# Patient Record
Sex: MALE | Race: WHITE | NOT HISPANIC OR LATINO
[De-identification: names, ages, dates, MRNs, and addresses within clinical notes are randomized per-mention and may not be internally consistent; named-entity substitution may affect disease eponyms.]

---

## 2021-11-16 ENCOUNTER — APPOINTMENT (OUTPATIENT)
Dept: INTERNAL MEDICINE | Facility: CLINIC | Age: 76
End: 2021-11-16
Payer: MEDICARE

## 2021-11-16 ENCOUNTER — NON-APPOINTMENT (OUTPATIENT)
Age: 76
End: 2021-11-16

## 2021-11-16 ENCOUNTER — LABORATORY RESULT (OUTPATIENT)
Age: 76
End: 2021-11-16

## 2021-11-16 VITALS
HEART RATE: 74 BPM | TEMPERATURE: 97.8 F | BODY MASS INDEX: 24.73 KG/M2 | OXYGEN SATURATION: 96 % | WEIGHT: 167 LBS | HEIGHT: 69 IN

## 2021-11-16 VITALS — DIASTOLIC BLOOD PRESSURE: 64 MMHG | SYSTOLIC BLOOD PRESSURE: 122 MMHG

## 2021-11-16 DIAGNOSIS — Z78.9 OTHER SPECIFIED HEALTH STATUS: ICD-10-CM

## 2021-11-16 DIAGNOSIS — Z84.1 FAMILY HISTORY OF DISORDERS OF KIDNEY AND URETER: ICD-10-CM

## 2021-11-16 PROCEDURE — 36415 COLL VENOUS BLD VENIPUNCTURE: CPT

## 2021-11-16 PROCEDURE — G0439: CPT

## 2021-11-16 PROCEDURE — 93000 ELECTROCARDIOGRAM COMPLETE: CPT

## 2021-11-16 NOTE — HISTORY OF PRESENT ILLNESS
[Spouse] : spouse [FreeTextEntry1] : cpx--to establish [de-identified] : was seeing MD at [facility]\par meds reviewed \par some benign short term memory loss reported by spouse which pt denies--no decline in adl's\par walks 10 miles/day at gym/does wts\par retired from [employer] for 14 yrs\par triple covid vaccinated\par sees HAL[facility]--last 5/21

## 2021-11-16 NOTE — HEALTH RISK ASSESSMENT
[Patient reported colonoscopy was normal] : Patient reported colonoscopy was normal [With Significant Other] : lives with significant other [] :  [Fully functional (bathing, dressing, toileting, transferring, walking, feeding)] : Fully functional (bathing, dressing, toileting, transferring, walking, feeding) [Fully functional (using the telephone, shopping, preparing meals, housekeeping, doing laundry, using] : Fully functional and needs no help or supervision to perform IADLs (using the telephone, shopping, preparing meals, housekeeping, doing laundry, using transportation, managing medications and managing finances) [ColonoscopyDate] : 1/16 [ColonoscopyComments] : Bartolo

## 2021-11-16 NOTE — ASSESSMENT
[FreeTextEntry1] : labs\par ecg\par pneumovax (if not given) and shingrex discussed\par pt defers neuro eval

## 2021-11-16 NOTE — PHYSICAL EXAM
[No Acute Distress] : no acute distress [Well Nourished] : well nourished [Well Developed] : well developed [Well-Appearing] : well-appearing [Normal Sclera/Conjunctiva] : normal sclera/conjunctiva [PERRL] : pupils equal round and reactive to light [EOMI] : extraocular movements intact [Normal Outer Ear/Nose] : the outer ears and nose were normal in appearance [Normal Oropharynx] : the oropharynx was normal [No JVD] : no jugular venous distention [No Lymphadenopathy] : no lymphadenopathy [Supple] : supple [Thyroid Normal, No Nodules] : the thyroid was normal and there were no nodules present [No Respiratory Distress] : no respiratory distress  [No Accessory Muscle Use] : no accessory muscle use [Clear to Auscultation] : lungs were clear to auscultation bilaterally [Normal Rate] : normal rate  [Regular Rhythm] : with a regular rhythm [Normal S1, S2] : normal S1 and S2 [No Murmur] : no murmur heard [No Carotid Bruits] : no carotid bruits [No Abdominal Bruit] : a ~M bruit was not heard ~T in the abdomen [No Varicosities] : no varicosities [Pedal Pulses Present] : the pedal pulses are present [No Edema] : there was no peripheral edema [No Palpable Aorta] : no palpable aorta [No Extremity Clubbing/Cyanosis] : no extremity clubbing/cyanosis [Soft] : abdomen soft [Non Tender] : non-tender [Non-distended] : non-distended [No Masses] : no abdominal mass palpated [No HSM] : no HSM [Normal Bowel Sounds] : normal bowel sounds [Normal Posterior Cervical Nodes] : no posterior cervical lymphadenopathy [Normal Anterior Cervical Nodes] : no anterior cervical lymphadenopathy [No CVA Tenderness] : no CVA  tenderness [No Spinal Tenderness] : no spinal tenderness [No Joint Swelling] : no joint swelling [Grossly Normal Strength/Tone] : grossly normal strength/tone [No Rash] : no rash [Coordination Grossly Intact] : coordination grossly intact [No Focal Deficits] : no focal deficits [Normal Gait] : normal gait [Deep Tendon Reflexes (DTR)] : deep tendon reflexes were 2+ and symmetric [Normal Affect] : the affect was normal [Normal Insight/Judgement] : insight and judgment were intact [Declined Rectal Exam] : declined rectal exam [Alert and Oriented x3] : oriented to person, place, and time [FreeTextEntry1] : as per gu

## 2021-11-17 ENCOUNTER — NON-APPOINTMENT (OUTPATIENT)
Age: 76
End: 2021-11-17

## 2021-11-17 LAB
ALBUMIN SERPL ELPH-MCNC: 4.4 G/DL
ALP BLD-CCNC: 113 U/L
ALT SERPL-CCNC: 16 U/L
ANION GAP SERPL CALC-SCNC: 13 MMOL/L
APPEARANCE: CLEAR
AST SERPL-CCNC: 21 U/L
BACTERIA: NEGATIVE
BASOPHILS # BLD AUTO: 0.06 K/UL
BASOPHILS NFR BLD AUTO: 0.8 %
BILIRUB SERPL-MCNC: 0.8 MG/DL
BILIRUBIN URINE: NEGATIVE
BLOOD URINE: NEGATIVE
BUN SERPL-MCNC: 22 MG/DL
CALCIUM SERPL-MCNC: 9.7 MG/DL
CHLORIDE SERPL-SCNC: 102 MMOL/L
CHOLEST SERPL-MCNC: 146 MG/DL
CO2 SERPL-SCNC: 24 MMOL/L
COLOR: NORMAL
CREAT SERPL-MCNC: 1.28 MG/DL
EOSINOPHIL # BLD AUTO: 0.16 K/UL
EOSINOPHIL NFR BLD AUTO: 2.1 %
GLUCOSE QUALITATIVE U: NEGATIVE
GLUCOSE SERPL-MCNC: 89 MG/DL
HCT VFR BLD CALC: 47.7 %
HDLC SERPL-MCNC: 44 MG/DL
HGB BLD-MCNC: 15.4 G/DL
HYALINE CASTS: 0 /LPF
IMM GRANULOCYTES NFR BLD AUTO: 0.3 %
KETONES URINE: NEGATIVE
LDLC SERPL CALC-MCNC: 83 MG/DL
LEUKOCYTE ESTERASE URINE: NEGATIVE
LYMPHOCYTES # BLD AUTO: 1.31 K/UL
LYMPHOCYTES NFR BLD AUTO: 17.2 %
MAN DIFF?: NORMAL
MCHC RBC-ENTMCNC: 31.6 PG
MCHC RBC-ENTMCNC: 32.3 GM/DL
MCV RBC AUTO: 97.7 FL
MICROSCOPIC-UA: NORMAL
MONOCYTES # BLD AUTO: 1.19 K/UL
MONOCYTES NFR BLD AUTO: 15.6 %
NEUTROPHILS # BLD AUTO: 4.89 K/UL
NEUTROPHILS NFR BLD AUTO: 64 %
NITRITE URINE: NEGATIVE
NONHDLC SERPL-MCNC: 101 MG/DL
PH URINE: 6.5
PLATELET # BLD AUTO: 296 K/UL
POTASSIUM SERPL-SCNC: 5.1 MMOL/L
PROT SERPL-MCNC: 7.1 G/DL
PROTEIN URINE: NEGATIVE
PSA SERPL-MCNC: 3.32 NG/ML
RBC # BLD: 4.88 M/UL
RBC # FLD: 13.2 %
RED BLOOD CELLS URINE: 2 /HPF
SODIUM SERPL-SCNC: 140 MMOL/L
SPECIFIC GRAVITY URINE: 1.02
SQUAMOUS EPITHELIAL CELLS: 0 /HPF
TRIGL SERPL-MCNC: 90 MG/DL
TSH SERPL-ACNC: 4.97 UIU/ML
UROBILINOGEN URINE: NORMAL
VIT B12 SERPL-MCNC: 466 PG/ML
WBC # FLD AUTO: 7.63 K/UL
WHITE BLOOD CELLS URINE: 0 /HPF

## 2021-11-18 LAB
THYROGLOB AB SERPL-ACNC: 500 IU/ML
THYROPEROXIDASE AB SERPL IA-ACNC: 192 IU/ML

## 2021-11-19 ENCOUNTER — NON-APPOINTMENT (OUTPATIENT)
Age: 76
End: 2021-11-19

## 2022-02-22 ENCOUNTER — APPOINTMENT (OUTPATIENT)
Dept: NEUROLOGY | Facility: CLINIC | Age: 77
End: 2022-02-22
Payer: MEDICARE

## 2022-02-22 VITALS
DIASTOLIC BLOOD PRESSURE: 96 MMHG | SYSTOLIC BLOOD PRESSURE: 159 MMHG | HEIGHT: 69 IN | WEIGHT: 168 LBS | BODY MASS INDEX: 24.88 KG/M2 | RESPIRATION RATE: 16 BRPM | TEMPERATURE: 97.9 F | HEART RATE: 68 BPM | OXYGEN SATURATION: 97 %

## 2022-02-22 PROCEDURE — 99204 OFFICE O/P NEW MOD 45 MIN: CPT

## 2022-02-23 LAB
FOLATE SERPL-MCNC: >20 NG/ML
HCYS SERPL-MCNC: 13.7 UMOL/L
T4 FREE SERPL-MCNC: 1.1 NG/DL

## 2022-02-23 NOTE — HISTORY OF PRESENT ILLNESS
[FreeTextEntry1] : 75 yo man accompanied by his wife, who brought him in.  He feels he has some short-term memory problems.  For example, he often forgets people's names (ie people on TV). \par \par His wife reports that he has been having difficulty with cognitive processing.  For example, about 5 years, she was driving to a family members house, and she took a detour, and he was a passenger in the car, and became very agitated in the car because he thought she was getting lost.\par \par As another example, his wife mentions an event where he was watching his grandchildren in the basement and the children were getting out of control, and she found him not attending to them, which is unusual for him.\par \par His wife reports that he repeats questions often, but he says there is usually an explanation for it (ie he's trying to clarify something).\bethanie \par He is independent in his ADLs.  He exercises often.  He drives and does not get lost around town, but goes to familiar places only.  He takes 5 mile walks around his neighborhood, does not get lost.  He exercises with friends, has lunch with friends, plays golf, and none of his friends have voiced similar concerns as his wife.\par \par His wife does the bill paying.  \par \par He was an  (retired in 2008), and files their taxes on [employer] tax each year.  He also files family members taxes without any problem.    \par \par He watches TV and follows the storylines.  He follows current events without any problem.\bethanie

## 2022-02-23 NOTE — PHYSICAL EXAM
[FreeTextEntry1] : MMSE = 30/30\par \par Awake, alert, oriented x 3.  Language fluent.  Comprehension intact.  Naming intact.  Repetition intact.  Affect normal.  Cranial nerves grossly intact.  Motor exam: normal bulk, normal tone, 5/5 in all four extremities.  No tremors or fasciculations.  Sensory exam: intact to LT/PP/CARILN/vibration.  DTRs: 2+ throughout.  Coordination: no dysmetria.  Gait: normal toe/heel/tandem gait.  Romberg - negative\par

## 2022-02-23 NOTE — ASSESSMENT
[FreeTextEntry1] : 77 yo man with cognitive symptoms reported by wife.  Independent in ADLs.  MMSE today is 30/30. Neurological exam is non-focal.\par \par Plan:\par 1. MRI brain\par 2. routine EEG\par 3. Labs: MMA, folate, homocysteine\par 4. Patient agrees with plan.\par 5. Follow up after testing completed.\par \par Marco Boss MD\par [facility]\par \par Greater than 50% of the encounter was spent on counseling and coordination of care discussing differential diagnosis, diagnostic testing, and treatment options. We have talked about appropriate follow up, and I have spent 45 minutes of face to face time with the patient.\par

## 2022-03-04 LAB — METHYLMALONATE SERPL-SCNC: 271 NMOL/L

## 2022-05-23 ENCOUNTER — APPOINTMENT (OUTPATIENT)
Dept: MRI IMAGING | Facility: CLINIC | Age: 77
End: 2022-05-23
Payer: MEDICARE

## 2022-05-23 ENCOUNTER — OUTPATIENT (OUTPATIENT)
Dept: OUTPATIENT SERVICES | Facility: HOSPITAL | Age: 77
LOS: 1 days | End: 2022-05-23
Payer: MEDICARE

## 2022-05-23 DIAGNOSIS — R41.3 OTHER AMNESIA: ICD-10-CM

## 2022-05-23 PROCEDURE — G1004: CPT

## 2022-05-23 PROCEDURE — 70551 MRI BRAIN STEM W/O DYE: CPT | Mod: 26,ME

## 2022-05-23 PROCEDURE — 70551 MRI BRAIN STEM W/O DYE: CPT | Mod: ME

## 2022-05-26 ENCOUNTER — APPOINTMENT (OUTPATIENT)
Dept: NEUROLOGY | Facility: CLINIC | Age: 77
End: 2022-05-26
Payer: MEDICARE

## 2022-05-26 PROCEDURE — 95816 EEG AWAKE AND DROWSY: CPT

## 2022-06-06 ENCOUNTER — NON-APPOINTMENT (OUTPATIENT)
Age: 77
End: 2022-06-06

## 2022-06-07 ENCOUNTER — APPOINTMENT (OUTPATIENT)
Dept: NEUROLOGY | Facility: CLINIC | Age: 77
End: 2022-06-07

## 2022-07-20 ENCOUNTER — RX RENEWAL (OUTPATIENT)
Age: 77
End: 2022-07-20

## 2022-08-15 ENCOUNTER — RX RENEWAL (OUTPATIENT)
Age: 77
End: 2022-08-15

## 2022-09-01 ENCOUNTER — APPOINTMENT (OUTPATIENT)
Dept: INTERNAL MEDICINE | Facility: CLINIC | Age: 77
End: 2022-09-01

## 2022-09-01 VITALS
TEMPERATURE: 97.8 F | WEIGHT: 162 LBS | HEIGHT: 69 IN | HEART RATE: 67 BPM | OXYGEN SATURATION: 97 % | BODY MASS INDEX: 23.99 KG/M2

## 2022-09-01 VITALS — DIASTOLIC BLOOD PRESSURE: 78 MMHG | SYSTOLIC BLOOD PRESSURE: 128 MMHG

## 2022-09-01 PROCEDURE — 99213 OFFICE O/P EST LOW 20 MIN: CPT

## 2022-09-01 NOTE — HISTORY OF PRESENT ILLNESS
[FreeTextEntry1] : f/u htn and lipids [de-identified] : no progressive cognitive issues\par reviewed neuro notes\par same meds

## 2022-09-01 NOTE — ASSESSMENT
[FreeTextEntry1] : pt queries about needing statin--advise he can d/c statin and repeat lipids greater than 8 wks later\par losartann renewed\par cpx 12/22

## 2022-09-06 ENCOUNTER — RX RENEWAL (OUTPATIENT)
Age: 77
End: 2022-09-06

## 2022-09-22 ENCOUNTER — APPOINTMENT (OUTPATIENT)
Dept: NEUROLOGY | Facility: CLINIC | Age: 77
End: 2022-09-22

## 2022-09-22 PROCEDURE — 96136 PSYCL/NRPSYC TST PHY/QHP 1ST: CPT

## 2022-09-22 PROCEDURE — 96133 NRPSYC TST EVAL PHYS/QHP EA: CPT

## 2022-09-22 PROCEDURE — 96132 NRPSYC TST EVAL PHYS/QHP 1ST: CPT

## 2022-09-22 PROCEDURE — 96137 PSYCL/NRPSYC TST PHY/QHP EA: CPT

## 2022-09-22 PROCEDURE — 96116 NUBHVL XM PHYS/QHP 1ST HR: CPT

## 2022-11-02 ENCOUNTER — APPOINTMENT (OUTPATIENT)
Dept: NEUROLOGY | Facility: CLINIC | Age: 77
End: 2022-11-02

## 2022-11-02 PROCEDURE — 96133 NRPSYC TST EVAL PHYS/QHP EA: CPT

## 2022-11-02 PROCEDURE — 96132 NRPSYC TST EVAL PHYS/QHP 1ST: CPT

## 2022-12-01 ENCOUNTER — APPOINTMENT (OUTPATIENT)
Dept: NEUROLOGY | Facility: CLINIC | Age: 77
End: 2022-12-01

## 2022-12-01 ENCOUNTER — APPOINTMENT (OUTPATIENT)
Dept: INTERNAL MEDICINE | Facility: CLINIC | Age: 77
End: 2022-12-01

## 2022-12-01 ENCOUNTER — NON-APPOINTMENT (OUTPATIENT)
Age: 77
End: 2022-12-01

## 2022-12-01 VITALS
BODY MASS INDEX: 24.66 KG/M2 | HEART RATE: 61 BPM | WEIGHT: 166.5 LBS | HEIGHT: 69 IN | OXYGEN SATURATION: 98 % | TEMPERATURE: 98.3 F

## 2022-12-01 VITALS — DIASTOLIC BLOOD PRESSURE: 78 MMHG | SYSTOLIC BLOOD PRESSURE: 126 MMHG

## 2022-12-01 PROCEDURE — 93000 ELECTROCARDIOGRAM COMPLETE: CPT

## 2022-12-01 PROCEDURE — G0439: CPT

## 2022-12-01 PROCEDURE — 36415 COLL VENOUS BLD VENIPUNCTURE: CPT

## 2022-12-01 NOTE — HISTORY OF PRESENT ILLNESS
[FreeTextEntry1] : cpx [de-identified] : reviewed neuro notes\par no progressive cognitive issues\par off statin\par seeing Jeremy

## 2022-12-01 NOTE — HEALTH RISK ASSESSMENT
[With Significant Other] : lives with significant other [] :  [Fully functional (bathing, dressing, toileting, transferring, walking, feeding)] : Fully functional (bathing, dressing, toileting, transferring, walking, feeding) [Fully functional (using the telephone, shopping, preparing meals, housekeeping, doing laundry, using] : Fully functional and needs no help or supervision to perform IADLs (using the telephone, shopping, preparing meals, housekeeping, doing laundry, using transportation, managing medications and managing finances) [With Patient/Caregiver] : , with patient/caregiver [de-identified] : jessicaho, derm [AdvancecareDate] : 12/1/22

## 2022-12-02 LAB
ALBUMIN SERPL ELPH-MCNC: 4.1 G/DL
ALP BLD-CCNC: 111 U/L
ALT SERPL-CCNC: 12 U/L
ANION GAP SERPL CALC-SCNC: 8 MMOL/L
APPEARANCE: CLEAR
AST SERPL-CCNC: 21 U/L
BACTERIA: NEGATIVE
BASOPHILS # BLD AUTO: 0.04 K/UL
BASOPHILS NFR BLD AUTO: 0.6 %
BILIRUB SERPL-MCNC: 0.7 MG/DL
BILIRUBIN URINE: NEGATIVE
BLOOD URINE: NEGATIVE
BUN SERPL-MCNC: 16 MG/DL
CALCIUM SERPL-MCNC: 9.4 MG/DL
CHLORIDE SERPL-SCNC: 104 MMOL/L
CHOLEST SERPL-MCNC: 204 MG/DL
CO2 SERPL-SCNC: 29 MMOL/L
COLOR: YELLOW
CREAT SERPL-MCNC: 1.24 MG/DL
EGFR: 61 ML/MIN/1.73M2
EOSINOPHIL # BLD AUTO: 0.11 K/UL
EOSINOPHIL NFR BLD AUTO: 1.7 %
GLUCOSE QUALITATIVE U: NEGATIVE
GLUCOSE SERPL-MCNC: 96 MG/DL
HCT VFR BLD CALC: 44.2 %
HDLC SERPL-MCNC: 44 MG/DL
HGB BLD-MCNC: 15.1 G/DL
HYALINE CASTS: 1 /LPF
IMM GRANULOCYTES NFR BLD AUTO: 0.5 %
KETONES URINE: NEGATIVE
LDLC SERPL CALC-MCNC: 136 MG/DL
LEUKOCYTE ESTERASE URINE: NEGATIVE
LYMPHOCYTES # BLD AUTO: 1.06 K/UL
LYMPHOCYTES NFR BLD AUTO: 16.4 %
MAN DIFF?: NORMAL
MCHC RBC-ENTMCNC: 32.1 PG
MCHC RBC-ENTMCNC: 34.2 GM/DL
MCV RBC AUTO: 94 FL
MICROSCOPIC-UA: NORMAL
MONOCYTES # BLD AUTO: 1 K/UL
MONOCYTES NFR BLD AUTO: 15.5 %
NEUTROPHILS # BLD AUTO: 4.23 K/UL
NEUTROPHILS NFR BLD AUTO: 65.3 %
NITRITE URINE: NEGATIVE
NONHDLC SERPL-MCNC: 160 MG/DL
PH URINE: 6
PLATELET # BLD AUTO: 259 K/UL
POTASSIUM SERPL-SCNC: 5.1 MMOL/L
PROT SERPL-MCNC: 7.1 G/DL
PROTEIN URINE: NORMAL
RBC # BLD: 4.7 M/UL
RBC # FLD: 13.2 %
RED BLOOD CELLS URINE: 2 /HPF
SODIUM SERPL-SCNC: 140 MMOL/L
SPECIFIC GRAVITY URINE: 1.02
SQUAMOUS EPITHELIAL CELLS: 1 /HPF
TRIGL SERPL-MCNC: 120 MG/DL
TSH SERPL-ACNC: 3.91 UIU/ML
UROBILINOGEN URINE: NORMAL
WBC # FLD AUTO: 6.47 K/UL
WHITE BLOOD CELLS URINE: 1 /HPF

## 2022-12-08 ENCOUNTER — APPOINTMENT (OUTPATIENT)
Dept: NEUROLOGY | Facility: CLINIC | Age: 77
End: 2022-12-08

## 2022-12-12 ENCOUNTER — APPOINTMENT (OUTPATIENT)
Dept: NEUROLOGY | Facility: CLINIC | Age: 77
End: 2022-12-12

## 2022-12-12 VITALS
WEIGHT: 160 LBS | SYSTOLIC BLOOD PRESSURE: 156 MMHG | RESPIRATION RATE: 16 BRPM | HEART RATE: 71 BPM | DIASTOLIC BLOOD PRESSURE: 81 MMHG | HEIGHT: 69 IN | BODY MASS INDEX: 23.7 KG/M2

## 2022-12-12 PROCEDURE — 99215 OFFICE O/P EST HI 40 MIN: CPT

## 2022-12-12 NOTE — DATA REVIEWED
[de-identified] : 2022:\par EXAM: [id number] - MR BRAIN - ORDERED BY: DALI METCALF\par \par \par PROCEDURE DATE: 05/23/2022\par \par \par \par INTERPRETATION: Clinical indications: Memory loss.\par \par MRI of the brain was performed using sagittal T1 axial T1 T2 T2 FLAIR diffusion and susceptibility weighted sequence.\par \par Parenchymal volume loss is seen.\par \par There is no evidence acute hemorrhage mass or mass effect seen.\par \par Evaluation of the diffusion weighted sequence demonstrates no abnormal areas of restricted diffusion to suggest acute infarct.\par \par The large vessels demonstrate normal flow voids.\par \par Left maxillary polyp versus retention cyst is seen.\par \par Both mastoid and middle ear regions appear clear.\par \par IMPRESSION: No evidence of acute hemorrhage mass or mass effect is seen.\par \par --- End of Report ---\par \par \par \par \par \par \par SHERRY VERA MD; Attending Radiologist\par This document has been electronically signed. May 23 2022 6:14PM [de-identified] : 9/2022:\par SUMMARY AND IMPRESSIONS: \par Mr. Haile was referred for neuropsychological evaluation by his neurologist, Dr. Boss, given \par concerns regarding cognition. The following results were obtained in the context of adequate task \par engagement and euthymic mood. On the current evaluation, estimated premorbid intellectual abilities \par were slightly below expectation (low average range) based on a word-reading task; however, this may \par underrepresent his true premorbid abilities in the context of the findings below and reported educational \par and occupational history, which suggests premorbid functioning at least in the average range.\par Performance on a cognitive screen was below expectation and evidenced difficulty with confrontation \par naming (semantic paraphasias noted) and word retrieval (1/5 free recall, 2/4 cued recall, 2/4 multiple \par choice). With more in-depth examination, language functions were variable and notable for semantic \par paraphasias across multiple measures (i.e., MoCA, BNT, Semantic Probe). While comprehension, \par repetition, and phonemic and semantic verbal fluencies were intact, severe impairment was noted in \par confrontation naming. Though spelling was normatively intact, his performance evidenced difficulty \par spelling irregular words (surface dysgraphia), and a similar pattern was noted on a word reading task\par (surface dyslexia). Verbal learning and memory was intact. Visual encoding was impaired, with weakness \par in retrieval and consolidation, though he maintained what little he initially encoded over time. Basic \par auditory attention and processing speed were within normal limits. Regarding executive functions, \par working memory, cognitive flexibility, and cognitive inhibitory control were intact. Unimanual motor \par programming was weak bilaterally, but intact with the addition of language-imposed structure. Bimanual \par motor programming and praxis were intact bilaterally. Bilateral motor inhibition was apparent. \par Visuospatial/visuoconstruction and perception were generally intact with no evidence of gross distortion, \par though weakness in visual planning was evident. He did not endorse a significant level of symptoms \par consistent with depression or anxiety, somewhat inconsistent with his wifes report of notable anxiety and\par apathy. Psychomotor agitation was also reported by his wife (frequent tapping behavior). Additionally, \par she noted mild executive dysfunction in everyday life.\par The present pattern of findings is consistent with a mild neurocognitive disorder (also known as mild \par cognitive impairment [MCI]) given prominent difficulty in aspects of language functions, mild executive \par inefficiencies, and weakness in aspects of motor abilities in conjunction with the report of intact day to \par day functioning. Regarding etiology, his performance raises concern for the presence of an incipient \par neurodegenerative process such as a semantic variant primary progressive aphasia (svPPA), a language \par variant of frontotemporal dementia, given evidence of problems with semantic knowledge, semantic \par paraphasias, surface dyslexia/dysgraphia, and reported increased use of non-specific words. Additionally, \par mild executive inefficiencies noted on exam may occur in the case of svPPA. Anxiety, noted by his wife,\par may also be contributing to his difficulties day to day and exacerbating existing weaknesses.

## 2022-12-12 NOTE — HISTORY OF PRESENT ILLNESS
[FreeTextEntry1] : COVID : mild symptoms of cold.\par COVID VACCINE FULL.\par \par HPI:78yo RH WM with HTN, HLD, here for concerns of cognitive decline.\par + TPO and TG antibodies in , possibly in coincidence of COVID.\par \par PMH:\par Per Dr. Boss, 2022:\par 77 yo man accompanied by his wife, who brought him in. He feels he has some short-term memory problems. For example, he often forgets people's names (ie people on TV). \par His wife reports that he has been having difficulty with cognitive processing. For example, about 5 years, she was driving to a family members house, and she took a detour, and he was a passenger in the car, and became very agitated in the car because he thought she was getting lost.\par As another example, his wife mentions an event where he was watching his grandchildren in the basement and the children were getting out of control, and she found him not attending to them, which is unusual for him.\par His wife reports that he repeats questions often, but he says there is usually an explanation for it (ie he's trying to clarify something).\par He is independent in his ADLs. He exercises often. He drives and does not get lost around town, but goes to familiar places only. He takes 5 mile walks around his neighborhood, does not get lost. He exercises with friends, has lunch with friends, plays golf, and none of his friends have voiced similar concerns as his wife.\par His wife does the bill paying. \par He was an  (retired in ), and files their taxes on [employer] tax each year. He also files family members taxes without any problem. \par He watches TV and follows the story lines. He follows current events without any problem.\par \par \par Per wife, up to 5y ago, she noticed him to be nervous and agitated about a futile reason - he was worried about a driving route, when he was not even driving. He felt disoriented. Since then. she has noted issues with "processing" of information. He may not understand what is asked of him (task); he may have limited response to conversations, e.g. "ok" as if absent, or not understanding. \par \par -Memory: some names, some conversations and events, but per wife, there may be an issue of attention\par -Speech: limited spontaneous speech, often limited to a few words\par -Orientation: unclear\par -Praxis: ok with common items\par -Decision making/Executive fx/Multitasking: may have low attention; tends to refrain from complex tasks.\par \par -Sleep: some awakenings due to urination\par \par -Appetite: reduced; does not drink much water; tends to eat fast food, but not a novelty\par \par -Motor symptoms: ok; keeps very active; some fidgeting with hands and fingers; at times stares at his hand moving\par \par -B/B: polyuria\par \par -Psychiatric symptoms: significant anxiety, per wife he spends a lot of time in front of TV\par \par -Functional status:\par Calderón Index of [place] in Activities of Daily Living:\par 1. Bathing/Showerin\par 2. Dressin\par 3. Toiletin\par 4. Transferrin\par 5. Continence: 1\par 6: Feedin\par \par TOTAL: 6\par \par Chang-Yuri Instrumental Activities of Daily Living:\par A. Ability to Use Telephone: 1\par B. Shoppin\par C. Food Preparation: 0\par D. Housekeepin\par E. Laundry: 0\par F. Transportation: drives locally, no issues so far 1\par G. Responsibility for Own Medications: 1\par H: Ability to Handle Finances: 0\par \par TOTAL: 3\par \par CDR: 0.5-1\par \par -Professional status: retired [employer]\par \par PCP and other physicians:\par -PCP: DAGOBERTO GREWAL\par -Neuro: Seng Clement.\par \par Workup done:\par - MRI brain - ? subcortical > cortical atrophy L>R TP.\par -Prior MRi with Dr. Clement.\par -NPT - ? svPPA

## 2022-12-12 NOTE — ASSESSMENT
[FreeTextEntry1] : Assessment:\par 76yo RH WM with progressive cognitive difficulties over the last few years. \par Interestingly,the first element was intense disorientation and panic a few years ago when he did not recognize a location.\par Per NPT, svPPA may be in the differential, though speech seems to be appropriate now, though now rich.\par \par Diagnostic Impression:\par -PPA? LOAD?\par \par Plan:\par -FDG-PET - DDX FTD/PPA form AD\par -repeat thyroid antibodies (+ in 2021)\par -basic labs.\par \par Will consider working on the anxiety in the future and Rivastigmine. \par \par A thorough discussion was entertained with the patient/caregiver regarding the use of psychoactive medications, their possible benefits and AE profile, including the risk of cardiovascular complications, including but not limited to applicable black box warning and teratogenicity, where appropriate.\par We discussed the benefits of being active, physically and mentally, and the need to to establish a routine in this respect.\par Driving abilities and firearms possession and use were discussed, in relation to progression of the cognitive decline, and the need to assess them periodically.\par Patient/caregiver advised to bring previous records to this office.\par All questions were answered at the time of the visit. We are certainly available for further discussion as needed.\par Patient/caregiver fully understands and agree with the plan.\par

## 2022-12-12 NOTE — PHYSICAL EXAM
[General Appearance - Alert] : alert [General Appearance - In No Acute Distress] : in no acute distress [Oriented To Time, Place, And Person] : oriented to person, place, and time [Impaired Insight] : insight and judgment were intact [Affect] : the affect was normal [Person] : oriented to person [Place] : oriented to place [Time] : oriented to time [Short Term Intact] : short term memory intact [Remote Intact] : remote memory intact [Registration Intact] : recent registration memory intact [Concentration Intact] : normal concentrating ability [Visual Intact] : visual attention was ~T not ~L decreased [Naming Objects] : no difficulty naming common objects [Repeating Phrases] : no difficulty repeating a phrase [Writing A Sentence] : no difficulty writing a sentence [Fluency] : fluency intact [Comprehension] : comprehension intact [Reading] : reading intact [Current Events] : adequate knowledge of current events [Past History] : adequate knowledge of personal past history [Vocabulary] : adequate range of vocabulary [Total Score ___ / 30] : the patient achieved a score of [unfilled] /30 [Date / Time ___ / 5] : date / time [unfilled] / 5 [Place ___ / 5] : place [unfilled] / 5 [Registration ___ / 3] : registration [unfilled] / 3 [Serial Sevens ___/5] : serial sevens [unfilled] / 5 [Naming 2 Objects ___ / 2] : naming two objects [unfilled] / 2 [Repeating a Sentence ___ / 1] : repeating a sentence [unfilled] / 1 [Writing a Sentence ___ / 1] : write sentence [unfilled] / 1 [3-stage Verbal Command ___ / 3] : three-stage verbal command [unfilled] / 3 [Written Command ___ / 1] : written command [unfilled] / 1 [Copy a Design ___ / 1] : copy a design [unfilled] / 1 [Recall ___ / 3] : recall [unfilled] / 3 [Cranial Nerves Optic (II)] : visual acuity intact bilaterally,  visual fields full to confrontation, pupils equal round and reactive to light [Cranial Nerves Oculomotor (III)] : extraocular motion intact [Cranial Nerves Trigeminal (V)] : facial sensation intact symmetrically [Cranial Nerves Facial (VII)] : face symmetrical [Cranial Nerves Vestibulocochlear (VIII)] : hearing was intact bilaterally [Cranial Nerves Glossopharyngeal (IX)] : tongue and palate midline [Cranial Nerves Accessory (XI - Cranial And Spinal)] : head turning and shoulder shrug symmetric [Cranial Nerves Hypoglossal (XII)] : there was no tongue deviation with protrusion [Motor Tone] : muscle tone was normal in all four extremities [Motor Strength] : muscle strength was normal in all four extremities [Involuntary Movements] : no involuntary movements were seen [No Muscle Atrophy] : normal bulk in all four extremities [Motor Handedness Right-Handed] : the patient is right hand dominant [Sensation Tactile Decrease] : light touch was intact [Balance] : balance was intact [2+] : Ankle jerk left 2+ [Sclera] : the sclera and conjunctiva were normal [PERRL With Normal Accommodation] : pupils were equal in size, round, reactive to light, with normal accommodation [Extraocular Movements] : extraocular movements were intact [No APD] : no afferent pupillary defect [No ROHAN] : no internuclear ophthalmoplegia [Full Visual Field] : full visual field [Outer Ear] : the ears and nose were normal in appearance [Oropharynx] : the oropharynx was normal [Neck Appearance] : the appearance of the neck was normal [Thyroid Diffuse Enlargement] : the thyroid was not enlarged [Thyroid Nodule] : there were no palpable thyroid nodules [Arterial Pulses Carotid] : carotid pulses were normal with no bruits [Abnormal Walk] : normal gait [] : no rash [Motor Strength Upper Extremities Bilaterally] : strength was normal in both upper extremities [Motor Strength Lower Extremities Bilaterally] : strength was normal in both lower extremities [Romberg's Sign] : Romberg's sign was negtive [Past-pointing] : there was no past-pointing [Tremor] : no tremor present [Plantar Reflex Right Only] : normal on the right [Plantar Reflex Left Only] : normal on the left [FreeTextEntry5] : + PM R>L [FreeTextEntry4] : Mental Status Exam\par Presidents: 5/5\par Alternating Pattern: ok\par Spiral: ok\par Clock: 3/3\par Repetition: ok \par \par Trail A: 38"; B: 47"\par Fluency: A: 15; Animals: 18\par \par Go-No-Go: \par \par R/L discrimination on self and examiner: ok\par Cross-line commands: ok\par Praxis: \par -Motor: ok\par -Dynamic/Luria: ok\par -Ideomotor/Imitation: ok\par -Ideational/writing/closing-in: ok\par -Dressing: ok.\par Proverbs:\par -It's no use crying over spilled milk: ok\par -A rolling stone gathers no mart: no\par -A chain is only as strong as its weakest link: ok\par -You can’t teach an old dog new tricks: ok\par -Laughter is the best medicine: ok. [FreeTextEntry6] : mild limitation ROM in L shoulder; mild increased tone in LUE [FreeTextEntry8] : Normal, pull and march ok; pivots in 2-3 steps.

## 2022-12-14 ENCOUNTER — NON-APPOINTMENT (OUTPATIENT)
Age: 77
End: 2022-12-14

## 2022-12-29 ENCOUNTER — NON-APPOINTMENT (OUTPATIENT)
Age: 77
End: 2022-12-29

## 2023-01-11 ENCOUNTER — APPOINTMENT (OUTPATIENT)
Dept: NUCLEAR MEDICINE | Facility: IMAGING CENTER | Age: 78
End: 2023-01-11
Payer: MEDICARE

## 2023-01-11 ENCOUNTER — OUTPATIENT (OUTPATIENT)
Dept: OUTPATIENT SERVICES | Facility: HOSPITAL | Age: 78
LOS: 1 days | End: 2023-01-11
Payer: MEDICARE

## 2023-01-11 ENCOUNTER — RESULT REVIEW (OUTPATIENT)
Age: 78
End: 2023-01-11

## 2023-01-11 DIAGNOSIS — G31.09 OTHER FRONTOTEMPORAL NEUROCOGNITIVE DISORDER: ICD-10-CM

## 2023-01-11 DIAGNOSIS — G31.01 PICK'S DISEASE: ICD-10-CM

## 2023-01-11 PROCEDURE — 78608 BRAIN IMAGING (PET): CPT

## 2023-01-11 PROCEDURE — A9552: CPT

## 2023-01-11 PROCEDURE — 78999 UNLISTED MISC PX DX NUC MED: CPT | Mod: 26,MH

## 2023-01-11 PROCEDURE — 78999 UNLISTED MISC PX DX NUC MED: CPT

## 2023-01-11 PROCEDURE — 78608 BRAIN IMAGING (PET): CPT | Mod: 26,MH

## 2023-01-18 LAB
ANA SER IF-ACNC: NEGATIVE
B BURGDOR AB SER-IMP: NEGATIVE
B BURGDOR IGG+IGM SER QL: 0.03 INDEX
CRP SERPL-MCNC: <3 MG/L
ERYTHROCYTE [SEDIMENTATION RATE] IN BLOOD BY WESTERGREN METHOD: < 2 MM/HR
FOLATE SERPL-MCNC: 10.9 NG/ML
HCYS SERPL-MCNC: 13.7 UMOL/L
THYROGLOB AB SERPL-ACNC: 617 IU/ML
THYROPEROXIDASE AB SERPL IA-ACNC: 275 IU/ML
VIT B12 SERPL-MCNC: 394 PG/ML

## 2023-01-19 LAB — T PALLIDUM AB SER QL IA: NEGATIVE

## 2023-01-20 LAB — VIT B1 SERPL-MCNC: 145.7 NMOL/L

## 2023-01-22 LAB — METHYLMALONATE SERPL-SCNC: 300 NMOL/L

## 2023-01-24 DIAGNOSIS — F02.80 PICK'S DISEASE: ICD-10-CM

## 2023-01-24 DIAGNOSIS — G31.01 PICK'S DISEASE: ICD-10-CM

## 2023-01-24 DIAGNOSIS — F02.A0 OTHER FRONTOTEMPORAL DEMENTIA: ICD-10-CM

## 2023-01-24 DIAGNOSIS — G31.09 OTHER FRONTOTEMPORAL DEMENTIA: ICD-10-CM

## 2023-01-26 LAB
T3FREE SERPL-MCNC: 2.7 PG/ML
T4 FREE SERPL-MCNC: 1.1 NG/DL
TSH SERPL-ACNC: 4.99 UIU/ML

## 2023-03-27 ENCOUNTER — RX RENEWAL (OUTPATIENT)
Age: 78
End: 2023-03-27

## 2023-06-15 ENCOUNTER — RX RENEWAL (OUTPATIENT)
Age: 78
End: 2023-06-15

## 2023-07-18 ENCOUNTER — RX RENEWAL (OUTPATIENT)
Age: 78
End: 2023-07-18

## 2023-07-20 ENCOUNTER — APPOINTMENT (OUTPATIENT)
Dept: INTERNAL MEDICINE | Facility: CLINIC | Age: 78
End: 2023-07-20
Payer: MEDICARE

## 2023-07-20 ENCOUNTER — RX RENEWAL (OUTPATIENT)
Age: 78
End: 2023-07-20

## 2023-07-20 VITALS
HEART RATE: 63 BPM | BODY MASS INDEX: 23.85 KG/M2 | WEIGHT: 161 LBS | SYSTOLIC BLOOD PRESSURE: 156 MMHG | TEMPERATURE: 97.9 F | OXYGEN SATURATION: 98 % | HEIGHT: 69 IN | DIASTOLIC BLOOD PRESSURE: 84 MMHG

## 2023-07-20 DIAGNOSIS — R41.89 OTHER SYMPTOMS AND SIGNS INVOLVING COGNITIVE FUNCTIONS AND AWARENESS: ICD-10-CM

## 2023-07-20 DIAGNOSIS — E78.5 HYPERLIPIDEMIA, UNSPECIFIED: ICD-10-CM

## 2023-07-20 PROCEDURE — 36415 COLL VENOUS BLD VENIPUNCTURE: CPT

## 2023-07-20 PROCEDURE — 99214 OFFICE O/P EST MOD 30 MIN: CPT | Mod: 25

## 2023-07-20 NOTE — HISTORY OF PRESENT ILLNESS
[FreeTextEntry1] : f/u htn [de-identified] : reviewed neuro notes and meds and labs---tsh mildly up\par off statin\par sees HAL Dejesus)\par stable cognitive status--managing adl's--mostly benign memory loss\par

## 2023-07-21 LAB
CHOLEST SERPL-MCNC: 177 MG/DL
HDLC SERPL-MCNC: 40 MG/DL
LDLC SERPL CALC-MCNC: 110 MG/DL
NONHDLC SERPL-MCNC: 137 MG/DL
T4 FREE SERPL-MCNC: 1.1 NG/DL
TRIGL SERPL-MCNC: 153 MG/DL
TSH SERPL-ACNC: 3.38 UIU/ML

## 2023-09-01 ENCOUNTER — APPOINTMENT (OUTPATIENT)
Dept: UROLOGY | Facility: CLINIC | Age: 78
End: 2023-09-01

## 2023-12-12 ENCOUNTER — APPOINTMENT (OUTPATIENT)
Dept: INTERNAL MEDICINE | Facility: CLINIC | Age: 78
End: 2023-12-12
Payer: MEDICARE

## 2023-12-12 VITALS — SYSTOLIC BLOOD PRESSURE: 114 MMHG | DIASTOLIC BLOOD PRESSURE: 72 MMHG

## 2023-12-12 VITALS
TEMPERATURE: 97 F | HEART RATE: 61 BPM | BODY MASS INDEX: 24.29 KG/M2 | HEIGHT: 69 IN | WEIGHT: 164 LBS | OXYGEN SATURATION: 96 %

## 2023-12-12 DIAGNOSIS — R41.3 OTHER AMNESIA: ICD-10-CM

## 2023-12-12 DIAGNOSIS — Z00.00 ENCOUNTER FOR GENERAL ADULT MEDICAL EXAMINATION W/OUT ABNORMAL FINDINGS: ICD-10-CM

## 2023-12-12 DIAGNOSIS — E03.9 HYPOTHYROIDISM, UNSPECIFIED: ICD-10-CM

## 2023-12-12 PROCEDURE — G0439: CPT

## 2023-12-12 PROCEDURE — 93000 ELECTROCARDIOGRAM COMPLETE: CPT

## 2023-12-12 RX ORDER — ATORVASTATIN CALCIUM 10 MG/1
10 TABLET, FILM COATED ORAL
Qty: 90 | Refills: 1 | Status: DISCONTINUED | COMMUNITY
Start: 2022-07-20 | End: 2023-12-12

## 2023-12-13 LAB
ALBUMIN SERPL ELPH-MCNC: 4.1 G/DL
ALP BLD-CCNC: 108 U/L
ALT SERPL-CCNC: 11 U/L
ANION GAP SERPL CALC-SCNC: 9 MMOL/L
APPEARANCE: CLEAR
AST SERPL-CCNC: 19 U/L
BACTERIA: NEGATIVE /HPF
BASOPHILS # BLD AUTO: 0.07 K/UL
BASOPHILS NFR BLD AUTO: 1.1 %
BILIRUB SERPL-MCNC: 0.7 MG/DL
BILIRUBIN URINE: NEGATIVE
BLOOD URINE: NEGATIVE
BUN SERPL-MCNC: 25 MG/DL
CALCIUM SERPL-MCNC: 9.4 MG/DL
CAST: 0 /LPF
CHLORIDE SERPL-SCNC: 103 MMOL/L
CHOLEST SERPL-MCNC: 177 MG/DL
CO2 SERPL-SCNC: 25 MMOL/L
COLOR: YELLOW
CREAT SERPL-MCNC: 1.32 MG/DL
EGFR: 56 ML/MIN/1.73M2
EOSINOPHIL # BLD AUTO: 0.23 K/UL
EOSINOPHIL NFR BLD AUTO: 3.6 %
EPITHELIAL CELLS: 0 /HPF
GLUCOSE QUALITATIVE U: NEGATIVE MG/DL
GLUCOSE SERPL-MCNC: 90 MG/DL
HCT VFR BLD CALC: 46.1 %
HDLC SERPL-MCNC: 44 MG/DL
HGB BLD-MCNC: 15 G/DL
IMM GRANULOCYTES NFR BLD AUTO: 0.3 %
KETONES URINE: NEGATIVE MG/DL
LDLC SERPL CALC-MCNC: 118 MG/DL
LEUKOCYTE ESTERASE URINE: NEGATIVE
LYMPHOCYTES # BLD AUTO: 0.97 K/UL
LYMPHOCYTES NFR BLD AUTO: 15.1 %
MAN DIFF?: NORMAL
MCHC RBC-ENTMCNC: 31.1 PG
MCHC RBC-ENTMCNC: 32.5 GM/DL
MCV RBC AUTO: 95.4 FL
MICROSCOPIC-UA: NORMAL
MONOCYTES # BLD AUTO: 1.01 K/UL
MONOCYTES NFR BLD AUTO: 15.7 %
NEUTROPHILS # BLD AUTO: 4.14 K/UL
NEUTROPHILS NFR BLD AUTO: 64.2 %
NITRITE URINE: NEGATIVE
NONHDLC SERPL-MCNC: 133 MG/DL
PH URINE: 5.5
PLATELET # BLD AUTO: 291 K/UL
POTASSIUM SERPL-SCNC: 5 MMOL/L
PROT SERPL-MCNC: 6.8 G/DL
PROTEIN URINE: NEGATIVE MG/DL
RBC # BLD: 4.83 M/UL
RBC # FLD: 13.2 %
RED BLOOD CELLS URINE: 1 /HPF
SODIUM SERPL-SCNC: 137 MMOL/L
SPECIFIC GRAVITY URINE: 1.02
TRIGL SERPL-MCNC: 80 MG/DL
TSH SERPL-ACNC: 3.94 UIU/ML
UROBILINOGEN URINE: 0.2 MG/DL
WBC # FLD AUTO: 6.44 K/UL
WHITE BLOOD CELLS URINE: 0 /HPF

## 2023-12-27 ENCOUNTER — RX RENEWAL (OUTPATIENT)
Age: 78
End: 2023-12-27

## 2024-04-08 RX ORDER — RIVASTIGMINE TARTRATE 1.5 MG/1
1.5 CAPSULE ORAL
Qty: 60 | Refills: 3 | Status: ACTIVE | COMMUNITY
Start: 2023-01-24 | End: 1900-01-01

## 2024-04-09 RX ORDER — LOSARTAN POTASSIUM 25 MG/1
25 TABLET, FILM COATED ORAL
Qty: 90 | Refills: 0 | Status: ACTIVE | COMMUNITY
Start: 2022-07-20 | End: 1900-01-01

## 2024-06-03 ENCOUNTER — APPOINTMENT (OUTPATIENT)
Dept: INTERNAL MEDICINE | Facility: CLINIC | Age: 79
End: 2024-06-03
Payer: COMMERCIAL

## 2024-06-03 VITALS
DIASTOLIC BLOOD PRESSURE: 76 MMHG | TEMPERATURE: 97.7 F | HEART RATE: 77 BPM | HEIGHT: 69 IN | WEIGHT: 164 LBS | SYSTOLIC BLOOD PRESSURE: 117 MMHG | BODY MASS INDEX: 24.29 KG/M2 | OXYGEN SATURATION: 97 %

## 2024-06-03 DIAGNOSIS — R05.9 COUGH, UNSPECIFIED: ICD-10-CM

## 2024-06-03 DIAGNOSIS — I10 ESSENTIAL (PRIMARY) HYPERTENSION: ICD-10-CM

## 2024-06-03 PROCEDURE — 99213 OFFICE O/P EST LOW 20 MIN: CPT

## 2024-06-03 RX ORDER — BENZONATATE 200 MG/1
200 CAPSULE ORAL 3 TIMES DAILY
Qty: 1 | Refills: 0 | Status: ACTIVE | COMMUNITY
Start: 2024-06-03 | End: 1900-01-01

## 2024-06-03 NOTE — ASSESSMENT
[FreeTextEntry1] : renew tessalon consider further abx if not improved 2-3 days--pt to contact provider

## 2024-06-03 NOTE — HISTORY OF PRESENT ILLNESS
[FreeTextEntry1] : cough [de-identified] : seen at  2-3 wks ago with cough and low grade fever covid and flu neg--got amoxil 875, flonase and tessalon some residual cough

## 2024-06-25 ENCOUNTER — APPOINTMENT (OUTPATIENT)
Dept: NEUROLOGY | Facility: CLINIC | Age: 79
End: 2024-06-25

## 2024-06-25 PROCEDURE — 96136 PSYCL/NRPSYC TST PHY/QHP 1ST: CPT

## 2024-06-25 PROCEDURE — 96133 NRPSYC TST EVAL PHYS/QHP EA: CPT

## 2024-06-25 PROCEDURE — 96116 NUBHVL XM PHYS/QHP 1ST HR: CPT

## 2024-06-25 PROCEDURE — 96132 NRPSYC TST EVAL PHYS/QHP 1ST: CPT

## 2024-06-25 PROCEDURE — 96137 PSYCL/NRPSYC TST PHY/QHP EA: CPT

## 2024-07-18 ENCOUNTER — RX RENEWAL (OUTPATIENT)
Age: 79
End: 2024-07-18

## 2024-08-19 ENCOUNTER — APPOINTMENT (OUTPATIENT)
Dept: NEUROLOGY | Facility: CLINIC | Age: 79
End: 2024-08-19
Payer: COMMERCIAL

## 2024-08-19 PROCEDURE — 96133 NRPSYC TST EVAL PHYS/QHP EA: CPT

## 2024-09-09 ENCOUNTER — RX RENEWAL (OUTPATIENT)
Age: 79
End: 2024-09-09

## 2024-09-19 ENCOUNTER — APPOINTMENT (OUTPATIENT)
Dept: NEUROLOGY | Facility: CLINIC | Age: 79
End: 2024-09-19
Payer: COMMERCIAL

## 2024-09-19 VITALS
DIASTOLIC BLOOD PRESSURE: 90 MMHG | WEIGHT: 163 LBS | HEIGHT: 69 IN | BODY MASS INDEX: 24.14 KG/M2 | HEART RATE: 72 BPM | SYSTOLIC BLOOD PRESSURE: 145 MMHG

## 2024-09-19 DIAGNOSIS — I10 ESSENTIAL (PRIMARY) HYPERTENSION: ICD-10-CM

## 2024-09-19 DIAGNOSIS — E63.9 NUTRITIONAL DEFICIENCY, UNSPECIFIED: ICD-10-CM

## 2024-09-19 DIAGNOSIS — R41.3 OTHER AMNESIA: ICD-10-CM

## 2024-09-19 DIAGNOSIS — F02.A0 OTHER FRONTOTEMPORAL DEMENTIA: ICD-10-CM

## 2024-09-19 DIAGNOSIS — E78.5 HYPERLIPIDEMIA, UNSPECIFIED: ICD-10-CM

## 2024-09-19 DIAGNOSIS — F02.80 PICK'S DISEASE: ICD-10-CM

## 2024-09-19 DIAGNOSIS — G31.09 OTHER FRONTOTEMPORAL DEMENTIA: ICD-10-CM

## 2024-09-19 DIAGNOSIS — G31.01 PICK'S DISEASE: ICD-10-CM

## 2024-09-19 PROCEDURE — G2211 COMPLEX E/M VISIT ADD ON: CPT | Mod: NC

## 2024-09-19 PROCEDURE — 99205 OFFICE O/P NEW HI 60 MIN: CPT

## 2024-09-19 RX ORDER — RIVASTIGMINE TARTRATE 3 MG/1
3 CAPSULE ORAL
Qty: 60 | Refills: 3 | Status: ACTIVE | COMMUNITY
Start: 2024-09-19 | End: 1900-01-01

## 2024-09-19 NOTE — HISTORY OF PRESENT ILLNESS
[FreeTextEntry1] : COVID : mild symptoms of cold. COVID VACCINE FULL.  HPI interval hx [id number]: 79-year-old male presents for follow up with his wife. Wife reports  mood: reports anxiety but controlled. He Lost a lot of family members this year.  speech: stable sleep: ok  appetite: good. poor diet. eats unhealthily  Difficulty completing tasks  Walks 7 miles daily and goes to gym 7x weekly.   HPI:78yo RH WM with HTN, HLD, here for concerns of cognitive decline. + TPO and TG antibodies in , possibly in coincidence of COVID.  PMH: Per Dr. Boss, 2022: 77 yo man accompanied by his wife, who brought him in. He feels he has some short-term memory problems. For example, he often forgets people's names (ie people on TV).  His wife reports that he has been having difficulty with cognitive processing. For example, about 5 years, she was driving to a family members house, and she took a detour, and he was a passenger in the car, and became very agitated in the car because he thought she was getting lost. As another example, his wife mentions an event where he was watching his grandchildren in the basement and the children were getting out of control, and she found him not attending to them, which is unusual for him. His wife reports that he repeats questions often, but he says there is usually an explanation for it (ie he's trying to clarify something). He is independent in his ADLs. He exercises often. He drives and does not get lost around town, but goes to familiar places only. He takes 5 mile walks around his neighborhood, does not get lost. He exercises with friends, has lunch with friends, plays golf, and none of his friends have voiced similar concerns as his wife. His wife does the bill paying.  He was an  (retired in ), and files their taxes on [employer] each year. He also files family members taxes without any problem.  He watches TV and follows the story lines. He follows current events without any problem.   Per wife, up to 5y ago, she noticed him to be nervous and agitated about a futile reason - he was worried about a driving route, when he was not even driving. He felt disoriented. Since then. she has noted issues with "processing" of information. He may not understand what is asked of him (task); he may have limited response to conversations, e.g. "ok" as if absent, or not understanding.   -Memory: some names, some conversations and events, but per wife, there may be an issue of attention -Speech: limited spontaneous speech, often limited to a few words -Orientation: unclear -Praxis: ok with common items -Decision making/Executive fx/Multitasking: may have low attention; tends to refrain from complex tasks.  -Sleep: some awakenings due to urination  -Appetite: reduced; does not drink much water; tends to eat fast food, but not a novelty  -Motor symptoms: ok; keeps very active; some fidgeting with hands and fingers; at times stares at his hand moving  -B/B: polyuria  -Psychiatric symptoms: significant anxiety, per wife he spends a lot of time in front of TV  -Functional status: Calderón Index of Reading in Activities of Daily Livin. Bathing/Showerin 2. Dressin 3. Toiletin 4. Transferrin 5. Continence: 1 6: Feedin  TOTAL: 6  Snyder-Yuri Instrumental Activities of Daily Living: A. Ability to Use Telephone: 1 B. Shoppin C. Food Preparation: 0 D. Housekeepin E. Laundry: 1 F. Transportation: drives locally, no issues so far 1 G. Responsibility for Own Medications: 1 H: Ability to Handle Finances: 0  TOTAL: 4  CDR: 0.5-1  -Professional status: retired [employer]  PCP and other physicians: -PCP: DAGOBERTO GREWAL -Neuro: Seng Clement.  Workup done: - MRI brain - ? subcortical > cortical atrophy L>R TP. -Prior MRi with Dr. Clement. -NPT - ? svPPA

## 2024-09-19 NOTE — DATA REVIEWED
[de-identified] : 2022:\par  EXAM: [id number] - MR BRAIN - ORDERED BY: DALI METCALF\par  \par  \par  PROCEDURE DATE: 05/23/2022\par  \par  \par  \par  INTERPRETATION: Clinical indications: Memory loss.\par  \par  MRI of the brain was performed using sagittal T1 axial T1 T2 T2 FLAIR diffusion and susceptibility weighted sequence.\par  \par  Parenchymal volume loss is seen.\par  \par  There is no evidence acute hemorrhage mass or mass effect seen.\par  \par  Evaluation of the diffusion weighted sequence demonstrates no abnormal areas of restricted diffusion to suggest acute infarct.\par  \par  The large vessels demonstrate normal flow voids.\par  \par  Left maxillary polyp versus retention cyst is seen.\par  \par  Both mastoid and middle ear regions appear clear.\par  \par  IMPRESSION: No evidence of acute hemorrhage mass or mass effect is seen.\par  \par  --- End of Report ---\par  \par  \par  \par  \par  \par  \par  SHERRY VERA MD; Attending Radiologist\par  This document has been electronically signed. May 23 2022 6:14PM [de-identified] : 9/2022:\par  SUMMARY AND IMPRESSIONS: \par  Mr. Haile was referred for neuropsychological evaluation by his neurologist, Dr. Boss, given \par  concerns regarding cognition. The following results were obtained in the context of adequate task \par  engagement and euthymic mood. On the current evaluation, estimated premorbid intellectual abilities \par  were slightly below expectation (low average range) based on a word-reading task; however, this may \par  underrepresent his true premorbid abilities in the context of the findings below and reported educational \par  and occupational history, which suggests premorbid functioning at least in the average range.\par  Performance on a cognitive screen was below expectation and evidenced difficulty with confrontation \par  naming (semantic paraphasias noted) and word retrieval (1/5 free recall, 2/4 cued recall, 2/4 multiple \par  choice). With more in-depth examination, language functions were variable and notable for semantic \par  paraphasias across multiple measures (i.e., MoCA, BNT, Semantic Probe). While comprehension, \par  repetition, and phonemic and semantic verbal fluencies were intact, severe impairment was noted in \par  confrontation naming. Though spelling was normatively intact, his performance evidenced difficulty \par  spelling irregular words (surface dysgraphia), and a similar pattern was noted on a word reading task\par  (surface dyslexia). Verbal learning and memory was intact. Visual encoding was impaired, with weakness \par  in retrieval and consolidation, though he maintained what little he initially encoded over time. Basic \par  auditory attention and processing speed were within normal limits. Regarding executive functions, \par  working memory, cognitive flexibility, and cognitive inhibitory control were intact. Unimanual motor \par  programming was weak bilaterally, but intact with the addition of language-imposed structure. Bimanual \par  motor programming and praxis were intact bilaterally. Bilateral motor inhibition was apparent. \par  Visuospatial/visuoconstruction and perception were generally intact with no evidence of gross distortion, \par  though weakness in visual planning was evident. He did not endorse a significant level of symptoms \par  consistent with depression or anxiety, somewhat inconsistent with his wifes report of notable anxiety and\par  apathy. Psychomotor agitation was also reported by his wife (frequent tapping behavior). Additionally, \par  she noted mild executive dysfunction in everyday life.\par  The present pattern of findings is consistent with a mild neurocognitive disorder (also known as mild \par  cognitive impairment [MCI]) given prominent difficulty in aspects of language functions, mild executive \par  inefficiencies, and weakness in aspects of motor abilities in conjunction with the report of intact day to \par  day functioning. Regarding etiology, his performance raises concern for the presence of an incipient \par  neurodegenerative process such as a semantic variant primary progressive aphasia (svPPA), a language \par  variant of frontotemporal dementia, given evidence of problems with semantic knowledge, semantic \par  paraphasias, surface dyslexia/dysgraphia, and reported increased use of non-specific words. Additionally, \par  mild executive inefficiencies noted on exam may occur in the case of svPPA. Anxiety, noted by his wife,\par  may also be contributing to his difficulties day to day and exacerbating existing weaknesses.

## 2024-09-19 NOTE — ASSESSMENT
[FreeTextEntry1] : Assessment: 80yo RH WM with progressive cognitive difficulties over the last few years.  Interestingly,the first element was intense disorientation and panic a few years ago when he did not recognize a location. Per NPT, svPPA may be in the differential, though speech seems to be appropriate now, though now rich. FDG PET showed svppa as well.   Diagnostic Impression: -semantic PPA -anxiety   Plan: -Increased rivastigmine to 3 mg BID -Educated on importance of lifestyle modifications such as daily exercise, healthy balanced diet and good sleep habits  -Is not interested in getting treatment for anxiety at this time

## 2024-09-19 NOTE — PHYSICAL EXAM
[General Appearance - Alert] : alert [General Appearance - In No Acute Distress] : in no acute distress [Oriented To Time, Place, And Person] : oriented to person, place, and time [Impaired Insight] : insight and judgment were intact [Affect] : the affect was normal [Person] : oriented to person [Place] : oriented to place [Time] : oriented to time [Remote Intact] : remote memory intact [Registration Intact] : recent registration memory intact [Span Intact] : the attention span was normal [Concentration Intact] : normal concentrating ability [Visual Intact] : visual attention was ~T not ~L decreased [Naming Objects] : no difficulty naming common objects [Repeating Phrases] : no difficulty repeating a phrase [Writing A Sentence] : no difficulty writing a sentence [Fluency] : fluency intact [Comprehension] : comprehension intact [Reading] : reading intact [Current Events] : adequate knowledge of current events [Past History] : adequate knowledge of personal past history [Vocabulary] : adequate range of vocabulary [Total Score ___ / 30] : the patient achieved a score of [unfilled] /30 [Date / Time ___ / 5] : date / time [unfilled] / 5 [Place ___ / 5] : place [unfilled] / 5 [Registration ___ / 3] : registration [unfilled] / 3 [Serial Sevens ___/5] : serial sevens [unfilled] / 5 [Naming 2 Objects ___ / 2] : naming two objects [unfilled] / 2 [Repeating a Sentence ___ / 1] : repeating a sentence [unfilled] / 1 [Writing a Sentence ___ / 1] : write sentence [unfilled] / 1 [3-stage Verbal Command ___ / 3] : three-stage verbal command [unfilled] / 3 [Written Command ___ / 1] : written command [unfilled] / 1 [Copy a Design ___ / 1] : copy a design [unfilled] / 1 [Recall ___ / 3] : recall [unfilled] / 3 [Cranial Nerves Optic (II)] : visual acuity intact bilaterally,  visual fields full to confrontation, pupils equal round and reactive to light [Cranial Nerves Oculomotor (III)] : extraocular motion intact [Cranial Nerves Trigeminal (V)] : facial sensation intact symmetrically [Cranial Nerves Facial (VII)] : face symmetrical [Cranial Nerves Vestibulocochlear (VIII)] : hearing was intact bilaterally [Cranial Nerves Glossopharyngeal (IX)] : tongue and palate midline [Cranial Nerves Accessory (XI - Cranial And Spinal)] : head turning and shoulder shrug symmetric [Cranial Nerves Hypoglossal (XII)] : there was no tongue deviation with protrusion [Motor Tone] : muscle tone was normal in all four extremities [Motor Strength] : muscle strength was normal in all four extremities [Involuntary Movements] : no involuntary movements were seen [No Muscle Atrophy] : normal bulk in all four extremities [Motor Handedness Right-Handed] : the patient is right hand dominant [Sensation Tactile Decrease] : light touch was intact [Balance] : balance was intact [2+] : Ankle jerk left 2+ [Sclera] : the sclera and conjunctiva were normal [PERRL With Normal Accommodation] : pupils were equal in size, round, reactive to light, with normal accommodation [Extraocular Movements] : extraocular movements were intact [No APD] : no afferent pupillary defect [No ROHAN] : no internuclear ophthalmoplegia [Full Visual Field] : full visual field [Outer Ear] : the ears and nose were normal in appearance [Oropharynx] : the oropharynx was normal [Neck Appearance] : the appearance of the neck was normal [Thyroid Diffuse Enlargement] : the thyroid was not enlarged [Thyroid Nodule] : there were no palpable thyroid nodules [Arterial Pulses Carotid] : carotid pulses were normal with no bruits [Abnormal Walk] : normal gait [] : no rash [Short Term Intact] : short term memory impaired [Motor Strength Upper Extremities Bilaterally] : strength was normal in both upper extremities [Motor Strength Lower Extremities Bilaterally] : strength was normal in both lower extremities [Romberg's Sign] : Romberg's sign was negtive [Past-pointing] : there was no past-pointing [Tremor] : no tremor present [Plantar Reflex Right Only] : normal on the right [Plantar Reflex Left Only] : normal on the left [FreeTextEntry4] : Mental Status Exam Presidents: 5/5 Alternating Pattern: ok Spiral: ok Clock: 3/3 Repetition: ok   Trail A: 38"; B: 47" Fluency: A: 15; Animals: 18  Go-No-Go: ok  R/L discrimination on self and examiner: ok Cross-line commands: ok Praxis: ok -Motor: ok -Dynamic/Luria: ok -Ideomotor/Imitation: ok -Ideational/writing/closing-in: ok -Dressing: ok. Proverbs: -It's no use crying over spilled milk: ok -A rolling stone gathers no mart: no -A chain is only as strong as its weakest link: ok -You can't teach an old dog new tricks: ok -Laughter is the best medicine: ok. [FreeTextEntry5] : + PM R>L [FreeTextEntry6] : mild limitation ROM in L shoulder; mild increased tone in LUE [FreeTextEntry8] : Normal, pull and march ok; pivots in 2-3 steps.

## 2024-10-07 RX ORDER — RIVASTIGMINE TARTRATE 3 MG/1
3 CAPSULE ORAL
Qty: 180 | Refills: 2 | Status: ACTIVE | COMMUNITY
Start: 2024-10-07 | End: 1900-01-01

## 2024-10-29 ENCOUNTER — APPOINTMENT (OUTPATIENT)
Dept: INTERNAL MEDICINE | Facility: CLINIC | Age: 79
End: 2024-10-29
Payer: MEDICARE

## 2024-10-29 ENCOUNTER — NON-APPOINTMENT (OUTPATIENT)
Age: 79
End: 2024-10-29

## 2024-10-29 VITALS
BODY MASS INDEX: 24.32 KG/M2 | DIASTOLIC BLOOD PRESSURE: 81 MMHG | HEART RATE: 82 BPM | HEIGHT: 69.84 IN | TEMPERATURE: 97.6 F | OXYGEN SATURATION: 97 % | SYSTOLIC BLOOD PRESSURE: 138 MMHG | WEIGHT: 168 LBS

## 2024-10-29 DIAGNOSIS — I10 ESSENTIAL (PRIMARY) HYPERTENSION: ICD-10-CM

## 2024-10-29 DIAGNOSIS — Z01.818 ENCOUNTER FOR OTHER PREPROCEDURAL EXAMINATION: ICD-10-CM

## 2024-10-29 PROCEDURE — G2211 COMPLEX E/M VISIT ADD ON: CPT

## 2024-10-29 PROCEDURE — 93000 ELECTROCARDIOGRAM COMPLETE: CPT

## 2024-10-29 PROCEDURE — 99213 OFFICE O/P EST LOW 20 MIN: CPT

## 2025-02-11 ENCOUNTER — RX RENEWAL (OUTPATIENT)
Age: 80
End: 2025-02-11

## 2025-03-20 ENCOUNTER — APPOINTMENT (OUTPATIENT)
Dept: NEUROLOGY | Facility: CLINIC | Age: 80
End: 2025-03-20

## 2025-03-27 ENCOUNTER — APPOINTMENT (OUTPATIENT)
Dept: NEUROLOGY | Facility: CLINIC | Age: 80
End: 2025-03-27
Payer: MEDICARE

## 2025-03-27 VITALS
DIASTOLIC BLOOD PRESSURE: 83 MMHG | WEIGHT: 165 LBS | HEART RATE: 94 BPM | HEIGHT: 69 IN | SYSTOLIC BLOOD PRESSURE: 128 MMHG | BODY MASS INDEX: 24.44 KG/M2

## 2025-03-27 DIAGNOSIS — G31.01 PICK'S DISEASE: ICD-10-CM

## 2025-03-27 DIAGNOSIS — F02.80 PICK'S DISEASE: ICD-10-CM

## 2025-03-27 DIAGNOSIS — E63.9 NUTRITIONAL DEFICIENCY, UNSPECIFIED: ICD-10-CM

## 2025-03-27 PROCEDURE — 99214 OFFICE O/P EST MOD 30 MIN: CPT

## 2025-03-27 PROCEDURE — G2211 COMPLEX E/M VISIT ADD ON: CPT

## 2025-06-23 ENCOUNTER — RX RENEWAL (OUTPATIENT)
Age: 80
End: 2025-06-23

## 2025-08-14 ENCOUNTER — NON-APPOINTMENT (OUTPATIENT)
Age: 80
End: 2025-08-14

## 2025-08-14 ENCOUNTER — APPOINTMENT (OUTPATIENT)
Dept: INTERNAL MEDICINE | Facility: CLINIC | Age: 80
End: 2025-08-14
Payer: MEDICARE

## 2025-08-14 ENCOUNTER — LABORATORY RESULT (OUTPATIENT)
Age: 80
End: 2025-08-14

## 2025-08-14 VITALS
HEART RATE: 68 BPM | SYSTOLIC BLOOD PRESSURE: 132 MMHG | BODY MASS INDEX: 24.44 KG/M2 | HEIGHT: 69 IN | OXYGEN SATURATION: 95 % | WEIGHT: 165 LBS | DIASTOLIC BLOOD PRESSURE: 75 MMHG | RESPIRATION RATE: 16 BRPM | TEMPERATURE: 98.3 F

## 2025-08-14 VITALS — SYSTOLIC BLOOD PRESSURE: 122 MMHG | DIASTOLIC BLOOD PRESSURE: 70 MMHG

## 2025-08-14 DIAGNOSIS — E03.9 HYPOTHYROIDISM, UNSPECIFIED: ICD-10-CM

## 2025-08-14 DIAGNOSIS — I10 ESSENTIAL (PRIMARY) HYPERTENSION: ICD-10-CM

## 2025-08-14 DIAGNOSIS — R41.3 OTHER AMNESIA: ICD-10-CM

## 2025-08-14 PROCEDURE — G2211 COMPLEX E/M VISIT ADD ON: CPT

## 2025-08-14 PROCEDURE — 99213 OFFICE O/P EST LOW 20 MIN: CPT

## 2025-08-15 LAB
ALBUMIN SERPL ELPH-MCNC: 4.2 G/DL
ALP BLD-CCNC: 102 U/L
ALT SERPL-CCNC: 13 U/L
ANION GAP SERPL CALC-SCNC: 11 MMOL/L
AST SERPL-CCNC: 22 U/L
BASOPHILS # BLD AUTO: 0 K/UL
BASOPHILS NFR BLD AUTO: 0 %
BILIRUB SERPL-MCNC: 0.6 MG/DL
BUN SERPL-MCNC: 18 MG/DL
CALCIUM SERPL-MCNC: 9.6 MG/DL
CHLORIDE SERPL-SCNC: 105 MMOL/L
CHOLEST SERPL-MCNC: 187 MG/DL
CO2 SERPL-SCNC: 24 MMOL/L
CREAT SERPL-MCNC: 1.24 MG/DL
EGFRCR SERPLBLD CKD-EPI 2021: 60 ML/MIN/1.73M2
EOSINOPHIL # BLD AUTO: 0.23 K/UL
EOSINOPHIL NFR BLD AUTO: 3.5 %
GLUCOSE SERPL-MCNC: 91 MG/DL
HCT VFR BLD CALC: 43.9 %
HDLC SERPL-MCNC: 42 MG/DL
HGB BLD-MCNC: 14.8 G/DL
LDLC SERPL-MCNC: 129 MG/DL
LYMPHOCYTES # BLD AUTO: 0.95 K/UL
LYMPHOCYTES NFR BLD AUTO: 14.2 %
MAN DIFF?: NORMAL
MCHC RBC-ENTMCNC: 32.2 PG
MCHC RBC-ENTMCNC: 33.7 G/DL
MCV RBC AUTO: 95.6 FL
MONOCYTES # BLD AUTO: 1 K/UL
MONOCYTES NFR BLD AUTO: 15 %
NEUTROPHILS # BLD AUTO: 4.5 K/UL
NEUTROPHILS NFR BLD AUTO: 67.3 %
NONHDLC SERPL-MCNC: 145 MG/DL
PLATELET # BLD AUTO: 261 K/UL
POTASSIUM SERPL-SCNC: 5.1 MMOL/L
PROT SERPL-MCNC: 6.8 G/DL
RBC # BLD: 4.59 M/UL
RBC # FLD: 13.4 %
SODIUM SERPL-SCNC: 140 MMOL/L
TRIGL SERPL-MCNC: 85 MG/DL
TSH SERPL-ACNC: 4.41 UIU/ML
WBC # FLD AUTO: 6.68 K/UL

## 2025-09-08 ENCOUNTER — APPOINTMENT (OUTPATIENT)
Dept: INTERNAL MEDICINE | Facility: CLINIC | Age: 80
End: 2025-09-08
Payer: MEDICARE

## 2025-09-08 VITALS
RESPIRATION RATE: 16 BRPM | HEART RATE: 76 BPM | DIASTOLIC BLOOD PRESSURE: 70 MMHG | TEMPERATURE: 98 F | BODY MASS INDEX: 24.44 KG/M2 | OXYGEN SATURATION: 96 % | WEIGHT: 165 LBS | SYSTOLIC BLOOD PRESSURE: 121 MMHG | HEIGHT: 69 IN

## 2025-09-08 DIAGNOSIS — R41.89 OTHER SYMPTOMS AND SIGNS INVOLVING COGNITIVE FUNCTIONS AND AWARENESS: ICD-10-CM

## 2025-09-08 DIAGNOSIS — R05.9 COUGH, UNSPECIFIED: ICD-10-CM

## 2025-09-08 PROCEDURE — 99213 OFFICE O/P EST LOW 20 MIN: CPT

## 2025-09-08 PROCEDURE — G2211 COMPLEX E/M VISIT ADD ON: CPT

## 2025-09-08 RX ORDER — DOXYCYCLINE HYCLATE 100 MG/1
100 TABLET ORAL TWICE DAILY
Qty: 14 | Refills: 0 | Status: ACTIVE | COMMUNITY
Start: 2025-09-08 | End: 1900-01-01

## 2025-09-08 RX ORDER — PREDNISONE 10 MG/1
10 TABLET ORAL
Qty: 10 | Refills: 0 | Status: ACTIVE | COMMUNITY
Start: 2025-09-08 | End: 1900-01-01